# Patient Record
Sex: MALE | Race: BLACK OR AFRICAN AMERICAN | NOT HISPANIC OR LATINO | Employment: OTHER | URBAN - METROPOLITAN AREA
[De-identification: names, ages, dates, MRNs, and addresses within clinical notes are randomized per-mention and may not be internally consistent; named-entity substitution may affect disease eponyms.]

---

## 2018-10-12 ENCOUNTER — HOSPITAL ENCOUNTER (EMERGENCY)
Facility: HOSPITAL | Age: 73
Discharge: HOME | End: 2018-10-12
Attending: EMERGENCY MEDICINE
Payer: MEDICARE

## 2018-10-12 VITALS
DIASTOLIC BLOOD PRESSURE: 65 MMHG | HEART RATE: 54 BPM | OXYGEN SATURATION: 100 % | HEIGHT: 68 IN | TEMPERATURE: 99 F | RESPIRATION RATE: 19 BRPM | WEIGHT: 137 LBS | SYSTOLIC BLOOD PRESSURE: 143 MMHG | BODY MASS INDEX: 20.76 KG/M2

## 2018-10-12 DIAGNOSIS — R07.9 CHEST PAIN: ICD-10-CM

## 2018-10-12 LAB
ALBUMIN SERPL BCP-MCNC: 4.1 G/DL
ALP SERPL-CCNC: 196 U/L
ALT SERPL W/O P-5'-P-CCNC: 39 U/L
ANION GAP SERPL CALC-SCNC: 11 MMOL/L
AST SERPL-CCNC: 30 U/L
BASOPHILS # BLD AUTO: 0.02 K/UL
BASOPHILS NFR BLD: 0.2 %
BILIRUB SERPL-MCNC: 0.4 MG/DL
BNP SERPL-MCNC: 26 PG/ML
BUN SERPL-MCNC: 11 MG/DL
CALCIUM SERPL-MCNC: 9.8 MG/DL
CHLORIDE SERPL-SCNC: 104 MMOL/L
CO2 SERPL-SCNC: 25 MMOL/L
CREAT SERPL-MCNC: 1.1 MG/DL
DIFFERENTIAL METHOD: ABNORMAL
EOSINOPHIL # BLD AUTO: 0.1 K/UL
EOSINOPHIL NFR BLD: 1 %
ERYTHROCYTE [DISTWIDTH] IN BLOOD BY AUTOMATED COUNT: 14.8 %
EST. GFR  (AFRICAN AMERICAN): >60 ML/MIN/1.73 M^2
EST. GFR  (NON AFRICAN AMERICAN): >60 ML/MIN/1.73 M^2
GLUCOSE SERPL-MCNC: 99 MG/DL
HCT VFR BLD AUTO: 43.6 %
HGB BLD-MCNC: 14.6 G/DL
LIPASE SERPL-CCNC: 10 U/L
LYMPHOCYTES # BLD AUTO: 2.9 K/UL
LYMPHOCYTES NFR BLD: 27.5 %
MCH RBC QN AUTO: 29.5 PG
MCHC RBC AUTO-ENTMCNC: 33.5 G/DL
MCV RBC AUTO: 88 FL
MONOCYTES # BLD AUTO: 0.8 K/UL
MONOCYTES NFR BLD: 7.5 %
NEUTROPHILS # BLD AUTO: 6.7 K/UL
NEUTROPHILS NFR BLD: 63.8 %
PLATELET # BLD AUTO: 275 K/UL
PMV BLD AUTO: 9 FL
POTASSIUM SERPL-SCNC: 4.4 MMOL/L
PROT SERPL-MCNC: 8.8 G/DL
RBC # BLD AUTO: 4.95 M/UL
SODIUM SERPL-SCNC: 140 MMOL/L
TROPONIN I SERPL DL<=0.01 NG/ML-MCNC: <0.006 NG/ML
TROPONIN I SERPL DL<=0.01 NG/ML-MCNC: <0.006 NG/ML
WBC # BLD AUTO: 10.49 K/UL

## 2018-10-12 PROCEDURE — 96374 THER/PROPH/DIAG INJ IV PUSH: CPT

## 2018-10-12 PROCEDURE — 84484 ASSAY OF TROPONIN QUANT: CPT | Mod: 91

## 2018-10-12 PROCEDURE — 93005 ELECTROCARDIOGRAM TRACING: CPT

## 2018-10-12 PROCEDURE — 83880 ASSAY OF NATRIURETIC PEPTIDE: CPT

## 2018-10-12 PROCEDURE — 85025 COMPLETE CBC W/AUTO DIFF WBC: CPT

## 2018-10-12 PROCEDURE — 99284 EMERGENCY DEPT VISIT MOD MDM: CPT | Mod: 25

## 2018-10-12 PROCEDURE — 83690 ASSAY OF LIPASE: CPT

## 2018-10-12 PROCEDURE — 96376 TX/PRO/DX INJ SAME DRUG ADON: CPT

## 2018-10-12 PROCEDURE — 93010 ELECTROCARDIOGRAM REPORT: CPT | Mod: ,,, | Performed by: INTERNAL MEDICINE

## 2018-10-12 PROCEDURE — 63600175 PHARM REV CODE 636 W HCPCS: Performed by: EMERGENCY MEDICINE

## 2018-10-12 PROCEDURE — 80053 COMPREHEN METABOLIC PANEL: CPT

## 2018-10-12 RX ORDER — GABAPENTIN 100 MG/1
100 CAPSULE ORAL 3 TIMES DAILY
COMMUNITY

## 2018-10-12 RX ORDER — OXYCODONE HYDROCHLORIDE 15 MG/1
40 TABLET ORAL EVERY 4 HOURS PRN
COMMUNITY

## 2018-10-12 RX ORDER — MORPHINE SULFATE 4 MG/ML
4 INJECTION, SOLUTION INTRAMUSCULAR; INTRAVENOUS
Status: COMPLETED | OUTPATIENT
Start: 2018-10-12 | End: 2018-10-12

## 2018-10-12 RX ORDER — ESCITALOPRAM OXALATE 10 MG/1
10 TABLET ORAL DAILY
COMMUNITY

## 2018-10-12 RX ORDER — MORPHINE SULFATE 10 MG/ML
4 INJECTION INTRAMUSCULAR; INTRAVENOUS; SUBCUTANEOUS
Status: COMPLETED | OUTPATIENT
Start: 2018-10-12 | End: 2018-10-12

## 2018-10-12 RX ORDER — ASPIRIN 325 MG
325 TABLET, DELAYED RELEASE (ENTERIC COATED) ORAL DAILY
COMMUNITY

## 2018-10-12 RX ORDER — CETIRIZINE HYDROCHLORIDE 10 MG/1
10 TABLET ORAL DAILY
Qty: 30 TABLET | Refills: 0 | Status: SHIPPED | OUTPATIENT
Start: 2018-10-12 | End: 2019-10-12

## 2018-10-12 RX ADMIN — MORPHINE SULFATE 4 MG: 10 INJECTION INTRAVENOUS at 11:10

## 2018-10-12 RX ADMIN — MORPHINE SULFATE 4 MG: 4 INJECTION INTRAVENOUS at 01:10

## 2018-10-12 NOTE — ED TRIAGE NOTES
"Pt arrived via personal ems with c/o left sided cp that began a few days ago but got worse this morning; pt states cp "comes and goes" pt states cp radiates to left side of back; denies n/v, fever, chills, sob and diarrhea at this time  "

## 2018-10-12 NOTE — DISCHARGE INSTRUCTIONS
The exact cause of your symptoms is unclear.  Your symptoms are possibly due to a musculoskeletal strain in the setting of coughing.  You have been prescribed Zyrtec to hopefully aid in coughing.  Your troponins were negative in the emergency room twice.  Chest x-ray did not show pneumonia. It is very important that you continue your medications as you have been prescribed and that you make an appointment to see your primary physician as soon as possible.  Return to the emergency room for persistent chest pain, breathing difficulty, lightheadedness, dizziness or any new, worsening or concerning symptoms.

## 2018-10-12 NOTE — ED PROVIDER NOTES
"Encounter Date: 10/12/2018    SCRIBE #1 NOTE: I, Albertateressa Valderrama, am scribing for, and in the presence of,  Jenni Hall MD. I have scribed the following portions of the note - Other sections scribed: HPI, ROS and PE.       History     Chief Complaint   Patient presents with    Chest Pain     chest pain started this am in left chest, non-radiating, sharp in nature, worse with breathing.  pt with dry cough x several days.  denies n/v/d or fever.     CC: Chest Pain    HPI: This 73 y.o. Male, with a medical history of pancreatitis, prostate cancer and stent placement, presents to the ED c/o left upper chest pain radiating to the left back (under the scapula) that began 4x days ago. Pt reports that the symptoms had been intermittent after onset, but worsened and became constant last night. He states that he does not experience any pain with palpation to the area, but notes that upon releasing he feels a "stabbing" pain to the chest. Pt reports taking oxycodone and a baby aspirin this morning for treatment without any relief. Symptoms are acute, constant and severe (10/10). He reports that the symptoms feel similar to when he underwent stent placement, but notes that the present episode of pain is more severe. Pt additionally reports experiencing shortness of breath, a productive cough (accompanied by brown phlegm; began upon arriving to the area last Wednesday), dizziness and abdominal pain. Pt denies nausea, diaphoresis and fever. No other associated symptoms. Pt notes that he is visiting the area from [place].          The history is provided by the patient. No  was used.     Review of patient's allergies indicates:   Allergen Reactions    Aspirin      Stomach issues    Codeine      Stomach issues     Past Medical History:   Diagnosis Date    Pancreatitis      Past Surgical History:   Procedure Laterality Date    APPENDECTOMY      BACK SURGERY      bill roque      bowel obstruction      " CHOLECYSTECTOMY      foreign object removal      GUM SURGERY      HERNIA REPAIR      PROSTATE SURGERY      small intestine removal      TONSILLECTOMY      ulcer surgery       History reviewed. No pertinent family history.  Social History     Tobacco Use    Smoking status: Never Smoker   Substance Use Topics    Alcohol use: No     Frequency: Never    Drug use: Yes     Types: Marijuana     Review of Systems   Constitutional: Negative for chills, diaphoresis and fever.   HENT: Negative for congestion, ear pain, rhinorrhea and sore throat.    Eyes: Negative for pain and visual disturbance.   Respiratory: Positive for cough (productive; accompanied by brown phlegm) and shortness of breath.    Cardiovascular: Positive for chest pain (left upper; radiating to the left back).   Gastrointestinal: Positive for abdominal pain. Negative for diarrhea, nausea and vomiting.   Genitourinary: Negative for dysuria.   Musculoskeletal: Positive for back pain. Negative for neck pain.   Skin: Negative for rash.   Neurological: Positive for dizziness. Negative for headaches.       Physical Exam     Initial Vitals [10/12/18 1001]   BP Pulse Resp Temp SpO2   (!) 150/68 70 20 98.1 °F (36.7 °C) 100 %      MAP       --         Physical Exam    Nursing note and vitals reviewed.  Constitutional: He is not diaphoretic. No distress.   Patient is thin and appears chronically ill.   HENT:   Head: Normocephalic and atraumatic.   Mouth/Throat: Oropharynx is clear and moist.   Eyes: Conjunctivae and EOM are normal. Pupils are equal, round, and reactive to light. No scleral icterus.   Neck: Normal range of motion. Neck supple. No JVD present.   Cardiovascular: Normal rate, regular rhythm and intact distal pulses.   Pulmonary/Chest: Breath sounds normal. No stridor. No respiratory distress.   There is left anterior chest wall tenderness present.   Abdominal: Soft. Bowel sounds are normal. He exhibits no distension. There is tenderness.   There  is epigastric tenderness present.   Musculoskeletal: Normal range of motion. He exhibits no edema or tenderness.   Neurological: He is alert and oriented to person, place, and time. He has normal strength. No cranial nerve deficit.   Skin: Skin is warm and dry. No rash noted.   Psychiatric: He has a normal mood and affect.         ED Course   Procedures  Labs Reviewed   CBC W/ AUTO DIFFERENTIAL - Abnormal; Notable for the following components:       Result Value    RDW 14.8 (*)     MPV 9.0 (*)     All other components within normal limits   COMPREHENSIVE METABOLIC PANEL - Abnormal; Notable for the following components:    Total Protein 8.8 (*)     Alkaline Phosphatase 196 (*)     All other components within normal limits   TROPONIN I   TROPONIN I   B-TYPE NATRIURETIC PEPTIDE   LIPASE     EKG Readings: (Independently Interpreted)   Initial Reading: No STEMI. Rhythm: Normal Sinus Rhythm. Heart Rate: 62. Ectopy: No Ectopy. Conduction: Normal. ST Segments: Non-Specific ST Segment Depression. T Waves Flipped: AVL.       Imaging Results          X-Ray Chest PA And Lateral (Final result)  Result time 10/12/18 11:13:52    Final result by Pineda Olivas MD (10/12/18 11:13:52)                 Impression:      No acute findings.      Electronically signed by: Pineda Olivas MD  Date:    10/12/2018  Time:    11:13             Narrative:    EXAMINATION:  XR CHEST PA AND LATERAL    CLINICAL HISTORY:  Chest Pain;    TECHNIQUE:  PA and lateral views of the chest were performed.    COMPARISON:  None    FINDINGS:  The cardiomediastinal contour is within normal limits.  The lungs are clear.  No pneumothorax.  No pleural effusions. Numerous surgical clips in the epigastric region.                              X-Rays:   Independently Interpreted Readings:   Chest X-Ray: No infiltrates.  No acute abnormalities.     Medical Decision Making:   History:   Old Medical Records: I decided to obtain old medical records.  Old Records Summarized:  other records.       <> Summary of Records: Evaluating ED in 2014 for superficial foreign body in left foot.  Differential Diagnosis:   GERD  Gastritis  Pancreatitis  Musculoskeletal pain  ACS  Independently Interpreted Test(s):   I have ordered and independently interpreted X-rays - see prior notes.  I have ordered and independently interpreted EKG Reading(s) - see prior notes  Clinical Tests:   Lab Tests: Ordered and Reviewed  Radiological Study: Ordered and Reviewed  Medical Tests: Ordered and Reviewed  ED Management:  EKG without definite acute ischemic changes.  Chest x-ray without pneumonia.  Labs within acceptable limits with negative troponins x2.  Patient has remained hemodynamically stable emergency room.  He is fit for discharge to follow up with his primary physician. Counseled on supportive care and appropriate medication usage. Dicussed extensively concerning symptoms for which to return to ER and the importance of follow up . Patient expressed understanding and agreement with treatment plan.   This chart completed using dictation software, as a result there may be some typographical errors.             Scribe Attestation:   Scribe #1: I performed the above scribed service and the documentation accurately describes the services I performed. I attest to the accuracy of the note.    Attending Attestation:           Physician Attestation for Scribe:  Physician Attestation Statement for Scribe #1: I, Jenni Hall MD, reviewed documentation, as scribed by Alberta Valderrama in my presence, and it is both accurate and complete.                    Clinical Impression:   The encounter diagnosis was Chest pain.      Disposition:   Disposition: Discharged  Condition: Stable                        Jenni Hall MD  10/20/18 0106